# Patient Record
Sex: FEMALE | Race: WHITE | NOT HISPANIC OR LATINO | Employment: UNEMPLOYED | URBAN - METROPOLITAN AREA
[De-identification: names, ages, dates, MRNs, and addresses within clinical notes are randomized per-mention and may not be internally consistent; named-entity substitution may affect disease eponyms.]

---

## 2020-02-10 ENCOUNTER — HISTORICAL (OUTPATIENT)
Dept: URGENT CARE | Facility: CLINIC | Age: 85
End: 2020-02-10

## 2020-02-10 LAB
BILIRUB SERPL-MCNC: NEGATIVE MG/DL
BLOOD URINE, POC: NEGATIVE
CLARITY, POC UA: CLEAR
COLOR, POC UA: YELLOW
GLUCOSE UR QL STRIP: NEGATIVE
KETONES UR QL STRIP: NEGATIVE
LEUKOCYTE EST, POC UA: NEGATIVE
NITRITE, POC UA: NEGATIVE
PH, POC UA: 7
PROTEIN, POC: NEGATIVE
SPECIFIC GRAVITY, POC UA: 1.01
UROBILINOGEN, POC UA: NORMAL

## 2020-02-12 LAB — FINAL CULTURE: NO GROWTH

## 2022-04-11 ENCOUNTER — HISTORICAL (OUTPATIENT)
Dept: ADMINISTRATIVE | Facility: HOSPITAL | Age: 87
End: 2022-04-11

## 2022-04-29 VITALS
BODY MASS INDEX: 17.72 KG/M2 | HEIGHT: 63 IN | WEIGHT: 100 LBS | OXYGEN SATURATION: 98 % | SYSTOLIC BLOOD PRESSURE: 143 MMHG | DIASTOLIC BLOOD PRESSURE: 73 MMHG

## 2022-09-22 ENCOUNTER — HISTORICAL (OUTPATIENT)
Dept: ADMINISTRATIVE | Facility: HOSPITAL | Age: 87
End: 2022-09-22

## 2023-03-08 ENCOUNTER — HOSPITAL ENCOUNTER (EMERGENCY)
Facility: HOSPITAL | Age: 88
Discharge: HOME | End: 2023-03-08
Attending: EMERGENCY MEDICINE
Payer: MEDICARE

## 2023-03-08 VITALS
OXYGEN SATURATION: 92 % | HEART RATE: 70 BPM | TEMPERATURE: 98 F | HEIGHT: 63 IN | SYSTOLIC BLOOD PRESSURE: 155 MMHG | BODY MASS INDEX: 16.12 KG/M2 | DIASTOLIC BLOOD PRESSURE: 78 MMHG | WEIGHT: 91 LBS | RESPIRATION RATE: 14 BRPM

## 2023-03-08 DIAGNOSIS — I16.0 HYPERTENSIVE URGENCY: Primary | ICD-10-CM

## 2023-03-08 DIAGNOSIS — R42 DIZZY: ICD-10-CM

## 2023-03-08 DIAGNOSIS — R53.1 WEAKNESS: ICD-10-CM

## 2023-03-08 LAB
ALBUMIN SERPL-MCNC: 4.1 G/DL (ref 3.4–4.8)
ALBUMIN/GLOB SERPL: 1.7 RATIO (ref 1.1–2)
ALP SERPL-CCNC: 88 UNIT/L (ref 40–150)
ALT SERPL-CCNC: 15 UNIT/L (ref 0–55)
APPEARANCE UR: CLEAR
AST SERPL-CCNC: 35 UNIT/L (ref 5–34)
BACTERIA #/AREA URNS AUTO: ABNORMAL /HPF
BASOPHILS # BLD AUTO: 0.06 X10(3)/MCL (ref 0–0.2)
BASOPHILS NFR BLD AUTO: 0.6 %
BILIRUB UR QL STRIP.AUTO: NEGATIVE MG/DL
BILIRUBIN DIRECT+TOT PNL SERPL-MCNC: 1.5 MG/DL
BUN SERPL-MCNC: 23.5 MG/DL (ref 9.8–20.1)
CALCIUM SERPL-MCNC: 9.8 MG/DL (ref 8.4–10.2)
CHLORIDE SERPL-SCNC: 109 MMOL/L (ref 98–111)
CO2 SERPL-SCNC: 25 MMOL/L (ref 23–31)
COLOR UR AUTO: YELLOW
CREAT SERPL-MCNC: 0.69 MG/DL (ref 0.55–1.02)
CYSTINE CRY URNS QL MICRO: ABNORMAL /HPF
DIGOXIN SERPL-MCNC: 0.7 NG/ML (ref 0.8–2)
EOSINOPHIL # BLD AUTO: 0.15 X10(3)/MCL (ref 0–0.9)
EOSINOPHIL NFR BLD AUTO: 1.4 %
ERYTHROCYTE [DISTWIDTH] IN BLOOD BY AUTOMATED COUNT: 14 % (ref 11.5–17)
GFR SERPLBLD CREATININE-BSD FMLA CKD-EPI: >60 MLS/MIN/1.73/M2
GLOBULIN SER-MCNC: 2.4 GM/DL (ref 2.4–3.5)
GLUCOSE SERPL-MCNC: 105 MG/DL (ref 75–121)
GLUCOSE UR QL STRIP.AUTO: NEGATIVE MG/DL
HCT VFR BLD AUTO: 46.5 % (ref 37–47)
HGB BLD-MCNC: 15.1 G/DL (ref 12–16)
IMM GRANULOCYTES # BLD AUTO: 0.05 X10(3)/MCL (ref 0–0.04)
IMM GRANULOCYTES NFR BLD AUTO: 0.5 %
KETONES UR QL STRIP.AUTO: NEGATIVE MG/DL
LEUKOCYTE ESTERASE UR QL STRIP.AUTO: ABNORMAL UNIT/L
LYMPHOCYTES # BLD AUTO: 1.14 X10(3)/MCL (ref 0.6–4.6)
LYMPHOCYTES NFR BLD AUTO: 10.6 %
MCH RBC QN AUTO: 30.4 PG
MCHC RBC AUTO-ENTMCNC: 32.5 G/DL (ref 33–36)
MCV RBC AUTO: 93.8 FL (ref 80–94)
MONOCYTES # BLD AUTO: 0.74 X10(3)/MCL (ref 0.1–1.3)
MONOCYTES NFR BLD AUTO: 6.9 %
NEUTROPHILS # BLD AUTO: 8.59 X10(3)/MCL (ref 2.1–9.2)
NEUTROPHILS NFR BLD AUTO: 80 %
NITRITE UR QL STRIP.AUTO: NEGATIVE
NRBC BLD AUTO-RTO: 0 %
PH UR STRIP.AUTO: 6.5 [PH]
PLATELET # BLD AUTO: 318 X10(3)/MCL (ref 130–400)
PMV BLD AUTO: 10 FL (ref 7.4–10.4)
POCT GLUCOSE: 101 MG/DL (ref 70–110)
POTASSIUM SERPL-SCNC: 4.4 MMOL/L (ref 3.5–5.1)
PROT SERPL-MCNC: 6.5 GM/DL (ref 5.8–7.6)
PROT UR QL STRIP.AUTO: NEGATIVE MG/DL
RBC # BLD AUTO: 4.96 X10(6)/MCL (ref 4.2–5.4)
RBC #/AREA URNS AUTO: ABNORMAL /HPF
RBC UR QL AUTO: NEGATIVE UNIT/L
SODIUM SERPL-SCNC: 143 MMOL/L (ref 132–146)
SP GR UR STRIP.AUTO: 1.02 (ref 1–1.03)
SQUAMOUS #/AREA URNS AUTO: <5 /HPF
TROPONIN I SERPL-MCNC: 0.01 NG/ML (ref 0–0.04)
UROBILINOGEN UR STRIP-ACNC: 0.2 MG/DL
WBC # SPEC AUTO: 10.7 X10(3)/MCL (ref 4.5–11.5)
WBC #/AREA URNS AUTO: <5 /HPF

## 2023-03-08 PROCEDURE — 99285 EMERGENCY DEPT VISIT HI MDM: CPT | Mod: 25

## 2023-03-08 PROCEDURE — 81001 URINALYSIS AUTO W/SCOPE: CPT | Performed by: EMERGENCY MEDICINE

## 2023-03-08 PROCEDURE — 93005 ELECTROCARDIOGRAM TRACING: CPT

## 2023-03-08 PROCEDURE — 80162 ASSAY OF DIGOXIN TOTAL: CPT | Performed by: EMERGENCY MEDICINE

## 2023-03-08 PROCEDURE — 93010 ELECTROCARDIOGRAM REPORT: CPT | Mod: ,,, | Performed by: INTERNAL MEDICINE

## 2023-03-08 PROCEDURE — 63600175 PHARM REV CODE 636 W HCPCS: Performed by: EMERGENCY MEDICINE

## 2023-03-08 PROCEDURE — 85025 COMPLETE CBC W/AUTO DIFF WBC: CPT | Performed by: EMERGENCY MEDICINE

## 2023-03-08 PROCEDURE — 84484 ASSAY OF TROPONIN QUANT: CPT | Performed by: EMERGENCY MEDICINE

## 2023-03-08 PROCEDURE — 25000003 PHARM REV CODE 250: Performed by: EMERGENCY MEDICINE

## 2023-03-08 PROCEDURE — 80053 COMPREHEN METABOLIC PANEL: CPT | Performed by: EMERGENCY MEDICINE

## 2023-03-08 PROCEDURE — 93010 EKG 12-LEAD: ICD-10-PCS | Mod: ,,, | Performed by: INTERNAL MEDICINE

## 2023-03-08 PROCEDURE — 96360 HYDRATION IV INFUSION INIT: CPT

## 2023-03-08 RX ORDER — METOPROLOL TARTRATE 25 MG/1
25 TABLET, FILM COATED ORAL 2 TIMES DAILY
COMMUNITY
Start: 2023-01-04

## 2023-03-08 RX ORDER — FLUTICASONE PROPIONATE 50 MCG
2 SPRAY, SUSPENSION (ML) NASAL 2 TIMES DAILY
COMMUNITY

## 2023-03-08 RX ORDER — ATORVASTATIN CALCIUM 20 MG/1
20 TABLET, FILM COATED ORAL DAILY
COMMUNITY
Start: 2023-01-04

## 2023-03-08 RX ORDER — GLUCOSAMINE/CHONDRO SU A 500-400 MG
1 TABLET ORAL 2 TIMES DAILY
COMMUNITY

## 2023-03-08 RX ORDER — MULTIVITAMIN/IRON/FOLIC ACID 18MG-0.4MG
1 TABLET ORAL DAILY
COMMUNITY

## 2023-03-08 RX ORDER — LATANOPROST 50 UG/ML
1 SOLUTION/ DROPS OPHTHALMIC DAILY
COMMUNITY
Start: 2023-01-19

## 2023-03-08 RX ORDER — METOPROLOL TARTRATE 25 MG/1
25 TABLET, FILM COATED ORAL
Status: COMPLETED | OUTPATIENT
Start: 2023-03-08 | End: 2023-03-08

## 2023-03-08 RX ORDER — VITAMIN E 268 MG
400 CAPSULE ORAL DAILY
COMMUNITY

## 2023-03-08 RX ORDER — ELECTROLYTES/DEXTROSE
1 SOLUTION, ORAL ORAL DAILY
COMMUNITY

## 2023-03-08 RX ORDER — DIGOXIN 125 MCG
0.12 TABLET ORAL DAILY
COMMUNITY
Start: 2023-01-04

## 2023-03-08 RX ORDER — APIXABAN 2.5 MG/1
2.5 TABLET, FILM COATED ORAL 2 TIMES DAILY
COMMUNITY
Start: 2023-01-04

## 2023-03-08 RX ADMIN — METOPROLOL TARTRATE 25 MG: 25 TABLET, FILM COATED ORAL at 05:03

## 2023-03-08 RX ADMIN — SODIUM CHLORIDE, POTASSIUM CHLORIDE, SODIUM LACTATE AND CALCIUM CHLORIDE 500 ML: 600; 310; 30; 20 INJECTION, SOLUTION INTRAVENOUS at 04:03

## 2023-03-08 NOTE — DISCHARGE INSTRUCTIONS
Continue all medications as prescribed.   Take your blood pressure in the mornings and write it down. Also take it in the evening before bed and write it down.  Take these recordings to Dr Willis

## 2023-03-08 NOTE — ED PROVIDER NOTES
Encounter Date: 3/8/2023    SCRIBE #1 NOTE: I, Deep Silverman, am scribing for, and in the presence of,  Moni Larson DO. I have scribed the following portions of the note - Other sections scribed: HPI, ROS, PE.     History     Chief Complaint   Patient presents with    Dizziness     C/O DIZZINESS WHEN GETTING OUT OF BED 2 HOURS PTA. STATES DIZZINESS SLIGHTLY BETTER NOW. DENIES PAIN.      89 year old female presents to ED c/o dizziness and weakness onset 0100. Pt states that she got up to use the restroom and felt dizzy and weak. Pt reports that laying down helps relieve her symptoms. Pt states that she had and episode of weakness in ED lobby but currently symptoms have resolved. Pt describes her dizziness ans feeling light headed. Pt denies chest pain and nausea. Pt denies history of similar symptoms. Pt reports she is prescribed digitalis.  PCP: Ishaan Willis MD  Cardiologist: [facility]    The history is provided by the patient. No  was used.   Dizziness  This is a new problem. The current episode started 1 to 2 hours ago. The problem has been resolved. Pertinent negatives include no chest pain. The symptoms are aggravated by walking. The symptoms are relieved by lying down. She has tried nothing for the symptoms.   Review of patient's allergies indicates:   Allergen Reactions    Brimonidine-timolol      Other reaction(s): eyes red, swollen, itching    Cephalexin      Other reaction(s): nausea,dizziness    Codeine      Other reaction(s): NAUSEA    Meperidine      Other reaction(s): UPSET STOMACH    Morphine      Other reaction(s): UPSET STOMACH    Penicillins      Other reaction(s): dizzy, nausea    Propoxyphene      Other reaction(s): NAUSEA    Sulfa (sulfonamide antibiotics)      No past medical history on file.  No past surgical history on file.  No family history on file.     Review of Systems   Constitutional:  Negative for chills and fever.   Cardiovascular:  Negative for chest pain.    Gastrointestinal:  Negative for nausea.   Neurological:  Positive for dizziness, weakness and light-headedness.     Physical Exam     Initial Vitals [03/08/23 0146]   BP Pulse Resp Temp SpO2   (!) 170/92 74 18 97.6 °F (36.4 °C) 98 %      MAP       --         Physical Exam    Nursing note and vitals reviewed.  Constitutional: She appears well-developed and well-nourished. She is not diaphoretic. She does not appear ill. No distress.   HENT:   Head: Normocephalic and atraumatic.   Right Ear: External ear normal.   Left Ear: External ear normal.   Mouth/Throat: Oropharynx is clear and moist.   Eyes: Conjunctivae and EOM are normal. Pupils are equal, round, and reactive to light.   Neck: Neck supple. No tracheal deviation present.   Cardiovascular:  Normal rate, regular rhythm, normal heart sounds and intact distal pulses.           No murmur heard.  Pulmonary/Chest: Breath sounds normal. No respiratory distress. She has no wheezes.   Abdominal: Abdomen is soft. Bowel sounds are normal. She exhibits no distension. There is no abdominal tenderness.   No right CVA tenderness.  No left CVA tenderness.   Musculoskeletal:         General: No edema. Normal range of motion.      Cervical back: Neck supple.     Neurological: She is alert and oriented to person, place, and time. She has normal strength. No cranial nerve deficit. GCS score is 15. GCS eye subscore is 4. GCS verbal subscore is 5. GCS motor subscore is 6.   Skin: Skin is warm and dry. Capillary refill takes less than 2 seconds. No rash noted. No pallor.       ED Course   Procedures  Labs Reviewed   COMPREHENSIVE METABOLIC PANEL - Abnormal; Notable for the following components:       Result Value    Blood Urea Nitrogen 23.5 (*)     Aspartate Aminotransferase 35 (*)     All other components within normal limits   CBC WITH DIFFERENTIAL - Abnormal; Notable for the following components:    MCHC 32.5 (*)     IG# 0.05 (*)     All other components within normal limits    URINALYSIS, REFLEX TO URINE CULTURE - Abnormal; Notable for the following components:    Leukocyte Esterase, UA 1+ (*)     All other components within normal limits   DIGOXIN LEVEL - Abnormal; Notable for the following components:    Digoxin Level 0.7 (*)     All other components within normal limits   URINALYSIS, MICROSCOPIC - Abnormal; Notable for the following components:    Cystine Cyrstals, UA Rare (*)     All other components within normal limits   TROPONIN I - Normal   CBC W/ AUTO DIFFERENTIAL    Narrative:     The following orders were created for panel order CBC auto differential.  Procedure                               Abnormality         Status                     ---------                               -----------         ------                     CBC with Differential[[id number]]        Abnormal            Final result                 Please view results for these tests on the individual orders.   POCT GLUCOSE   POCT GLUCOSE MONITORING CONTINUOUS     EKG Readings: (Independently Interpreted)   Rhythm: Atrial Fibrillation. Heart Rate: 78. Ectopy: No Ectopy. Conduction: Normal. ST Segment Depression: V5 and V6. T Waves: Normal.   Performed at 0352     Imaging Results              CT Head Without Contrast (Final result)  Result time 03/08/23 06:34:49      Final result by Will Gilliland MD (03/08/23 06:34:49)                   Impression:      No acute intracranial findings.    No significant discrepancy with the preliminary report.      Electronically signed by: Will Gilliland  Date:    03/08/2023  Time:    06:34               Narrative:    EXAMINATION:  CT HEAD WITHOUT CONTRAST    CLINICAL HISTORY:  Dizziness, persistent/recurrent, cardiac or vascular cause suspected;    TECHNIQUE:  CT imaging of the head performed from the skull base to the vertex without intravenous contrast.  mGycm. Automatic exposure control, adjustment of mA/kV or iterative reconstruction technique was used to reduce  radiation.    COMPARISON:  None Available.    FINDINGS:  There is no acute cortical infarct, hemorrhage or mass lesion.  There are some basal ganglia, cerebellar and cerebral white matter calcifications.  Elsewhere there is moderate patchy hypoattenuation in the cerebral white matter which is nonspecific but most commonly associated with chronic small vessel ischemic changes.  The ventricles are not significantly enlarged.  Mild generalized atrophy.  There are vascular calcifications.    Visualized paranasal sinuses and mastoid air cells are clear.                        Preliminary result by Will Gilliland MD (03/08/23 04:19:47)                   Narrative:    START OF REPORT:  TECHNIQUE: CT OF THE HEAD WAS PERFORMED WITHOUT INTRAVENOUS CONTRAST WITH AXIAL AS WELL AS CORONAL AND SAGITTAL IMAGES.    COMPARISON: NONE.    DOSAGE INFORMATION: AUTOMATED EXPOSURE CONTROL WAS UTILIZED.    CLINICAL HISTORY: DIZZINESS.    Findings:  Hemorrhage: No acute intracranial hemorrhage is seen.  CSF spaces: The ventricles, sulci and basal cisterns all appear moderately prominent consistent with global cerebral atrophy.  Brain parenchyma: There is preservation of the grey white junction throughout. Moderate microvascular change is seen in portions of the periventricular and deep white matter tracts.  Cerebellum: Unremarkable.  Vascular: Severe atheromatous calcification of the intracranial arteries is seen.  Sella and skull base: The sella appears to be within normal limits for age.  Herniation: None.  Intracranial calcifications: Incidental note is made of bilateral choroid plexus calcification. Incidental note is made of some pineal region calcification. Incidental note is made of some calcification of the falx. Incidental note is made of bilateral basal ganglia, coronal radiata and dentate nuclei calcifications.  Calvarium: No acute linear or depressed skull fracture is seen.    Maxillofacial Structures:  Paranasal sinuses: The  visualized paranasal sinuses appear clear with no mucoperiosteal thickening or air fluid levels identified.  Orbits: The orbits appear unremarkable.  Zygomatic arches: The zygomatic arches are intact and unremarkable.  Temporal bones and mastoids: The temporal bones and mastoids appear unremarkable.  TMJ: The mandibular condyles appear normally placed with respect to the mandibular fossa.      Impression:  1. No acute intracranial process identified. Details and findings as noted above.                          Preliminary result by Serge Kraft Jr., MD (03/08/23 04:19:47)                   Narrative:    START OF REPORT:  TECHNIQUE: CT OF THE HEAD WAS PERFORMED WITHOUT INTRAVENOUS CONTRAST WITH AXIAL AS WELL AS CORONAL AND SAGITTAL IMAGES.    COMPARISON: NONE.    DOSAGE INFORMATION: AUTOMATED EXPOSURE CONTROL WAS UTILIZED.    CLINICAL HISTORY: DIZZINESS.    Findings:  Hemorrhage: No acute intracranial hemorrhage is seen.  CSF spaces: The ventricles, sulci and basal cisterns all appear moderately prominent consistent with global cerebral atrophy.  Brain parenchyma: There is preservation of the grey white junction throughout. Moderate microvascular change is seen in portions of the periventricular and deep white matter tracts.  Cerebellum: Unremarkable.  Vascular: Severe atheromatous calcification of the intracranial arteries is seen.  Sella and skull base: The sella appears to be within normal limits for age.  Herniation: None.  Intracranial calcifications: Incidental note is made of bilateral choroid plexus calcification. Incidental note is made of some pineal region calcification. Incidental note is made of some calcification of the falx. Incidental note is made of bilateral basal ganglia, coronal radiata and dentate nuclei calcifications.  Calvarium: No acute linear or depressed skull fracture is seen.    Maxillofacial Structures:  Paranasal sinuses: The visualized paranasal sinuses appear clear with no  mucoperiosteal thickening or air fluid levels identified.  Orbits: The orbits appear unremarkable.  Zygomatic arches: The zygomatic arches are intact and unremarkable.  Temporal bones and mastoids: The temporal bones and mastoids appear unremarkable.  TMJ: The mandibular condyles appear normally placed with respect to the mandibular fossa.      Impression:  1. No acute intracranial process identified. Details and findings as noted above.                                         X-Ray Chest AP Portable (In process)                   X-Rays:   Independently Interpreted Readings:   Chest X-Ray: No acute abnormalities.   Medications   lactated ringers bolus 500 mL (0 mLs Intravenous Stopped 3/8/23 9212)   metoprolol tartrate (LOPRESSOR) tablet 25 mg (25 mg Oral Given 3/8/23 7701)         Medical Decision Making  Initial evaluation: well appearing, normal exam    Differential diagnosis includes but not limited to stroke, ACS, electrolyte abnormality, dehydration, anemia, UTI, pneumonia     ED management:   IVF   One BP reading of SBP 210s, otherwise has ranged from 150s-180s   Given home dose of Metoprolol with improvement  Labs normal  CXR clear and CT head normal  Discussed results with patient and daughter. Patient is feeling well, ambulating in ED without any symptoms. Shared decision making and offered admission for observation vs discharge home to follow up with PCP. Patient wants to go home, daughter and myself are comfortable with this. I recommended she continue all meds as prescribed. Take and record blood pressure twice daily and follow up with Dr Willis next week     Problems Addressed:  Hypertensive urgency: acute illness or injury that poses a threat to life or bodily functions    Amount and/or Complexity of Data Reviewed  External Data Reviewed: notes.     Details: Reviewed hospitalization in 12/2021 at [facility] for AFib with RVR.  She was admitted on a Cardizem drip and was transitioned to oral metoprolol and  digoxin as well as Eliquis.  She was discharged to rehab for therapy.  Labs: ordered. Decision-making details documented in ED Course.  Radiology: ordered and independent interpretation performed. Decision-making details documented in ED Course.  ECG/medicine tests: ordered and independent interpretation performed. Decision-making details documented in ED Course.    Risk  Prescription drug management.  Decision regarding hospitalization.          Scribe Attestation:   Scribe #1: I performed the above scribed service and the documentation accurately describes the services I performed. I attest to the accuracy of the note.    Attending Attestation:           Physician Attestation for Scribe:  Physician Attestation Statement for Scribe #1: I, Moni Larson, DO, reviewed documentation, as scribed by Deep Silverman in my presence, and it is both accurate and complete.                        Clinical Impression:   Final diagnoses:  [R53.1] Weakness  [I16.0] Hypertensive urgency (Primary)        ED Disposition Condition    Discharge Stable          ED Prescriptions    None       Follow-up Information       Follow up With Specialties Details Why Contact Info    Ishaan Willis MD Family Medicine Schedule an appointment as soon as possible for a visit on 3/13/2023  [address] [facility]  [facility] [address]  [phone]               Moni Larsno MD  03/08/23 0336

## 2023-09-15 ENCOUNTER — HOSPITAL ENCOUNTER (EMERGENCY)
Facility: HOSPITAL | Age: 88
Discharge: HOME | End: 2023-09-15
Attending: STUDENT IN AN ORGANIZED HEALTH CARE EDUCATION/TRAINING PROGRAM
Payer: MEDICARE

## 2023-09-15 VITALS
OXYGEN SATURATION: 97 % | HEIGHT: 63 IN | WEIGHT: 90 LBS | HEART RATE: 87 BPM | SYSTOLIC BLOOD PRESSURE: 174 MMHG | BODY MASS INDEX: 15.95 KG/M2 | TEMPERATURE: 98 F | RESPIRATION RATE: 17 BRPM | DIASTOLIC BLOOD PRESSURE: 76 MMHG

## 2023-09-15 DIAGNOSIS — S01.81XA FACIAL LACERATION, INITIAL ENCOUNTER: ICD-10-CM

## 2023-09-15 DIAGNOSIS — S09.90XA CLOSED HEAD INJURY, INITIAL ENCOUNTER: Primary | ICD-10-CM

## 2023-09-15 DIAGNOSIS — M25.532 ACUTE WRIST PAIN, LEFT: ICD-10-CM

## 2023-09-15 DIAGNOSIS — T14.90XA TRAUMA: ICD-10-CM

## 2023-09-15 LAB
ABORH RETYPE: NORMAL
ALBUMIN SERPL-MCNC: 4 G/DL (ref 3.4–4.8)
ALBUMIN/GLOB SERPL: 1.3 RATIO (ref 1.1–2)
ALP SERPL-CCNC: 106 UNIT/L (ref 40–150)
ALT SERPL-CCNC: 14 UNIT/L (ref 0–55)
AMPHET UR QL SCN: NEGATIVE
APPEARANCE UR: CLEAR
APTT PPP: 30.6 SECONDS (ref 23.2–33.7)
AST SERPL-CCNC: 46 UNIT/L (ref 5–34)
BACTERIA #/AREA URNS AUTO: NORMAL /HPF
BARBITURATE SCN PRESENT UR: NEGATIVE
BASOPHILS # BLD AUTO: 0.07 X10(3)/MCL
BASOPHILS NFR BLD AUTO: 0.7 %
BENZODIAZ UR QL SCN: NEGATIVE
BILIRUB SERPL-MCNC: 1.6 MG/DL
BILIRUB UR QL STRIP.AUTO: NEGATIVE
BUN SERPL-MCNC: 20.4 MG/DL (ref 9.8–20.1)
CALCIUM SERPL-MCNC: 9.4 MG/DL (ref 8.4–10.2)
CANNABINOIDS UR QL SCN: NEGATIVE
CHLORIDE SERPL-SCNC: 107 MMOL/L (ref 98–111)
CO2 SERPL-SCNC: 21 MMOL/L (ref 23–31)
COCAINE UR QL SCN: NEGATIVE
COLOR UR: ABNORMAL
CREAT SERPL-MCNC: 0.68 MG/DL (ref 0.55–1.02)
EOSINOPHIL # BLD AUTO: 0.16 X10(3)/MCL (ref 0–0.9)
EOSINOPHIL NFR BLD AUTO: 1.6 %
ERYTHROCYTE [DISTWIDTH] IN BLOOD BY AUTOMATED COUNT: 13.9 % (ref 11.5–17)
ETHANOL SERPL-MCNC: <10 MG/DL
FENTANYL UR QL SCN: NEGATIVE
GFR SERPLBLD CREATININE-BSD FMLA CKD-EPI: >60 MLS/MIN/1.73/M2
GLOBULIN SER-MCNC: 3.1 GM/DL (ref 2.4–3.5)
GLUCOSE SERPL-MCNC: 162 MG/DL (ref 75–121)
GLUCOSE UR QL STRIP.AUTO: NEGATIVE
GROUP & RH: NORMAL
HCT VFR BLD AUTO: 47 % (ref 37–47)
HGB BLD-MCNC: 15.3 G/DL (ref 12–16)
IMM GRANULOCYTES # BLD AUTO: 0.05 X10(3)/MCL (ref 0–0.04)
IMM GRANULOCYTES NFR BLD AUTO: 0.5 %
INDIRECT COOMBS GEL: NORMAL
INR PPP: 1
KETONES UR QL STRIP.AUTO: NEGATIVE
LACTATE SERPL-SCNC: 2 MMOL/L (ref 0.5–2.2)
LEUKOCYTE ESTERASE UR QL STRIP.AUTO: NEGATIVE
LYMPHOCYTES # BLD AUTO: 1.13 X10(3)/MCL (ref 0.6–4.6)
LYMPHOCYTES NFR BLD AUTO: 11.6 %
MCH RBC QN AUTO: 30.2 PG (ref 27–31)
MCHC RBC AUTO-ENTMCNC: 32.6 G/DL (ref 33–36)
MCV RBC AUTO: 92.9 FL (ref 80–94)
MDMA UR QL SCN: NEGATIVE
MONOCYTES # BLD AUTO: 0.68 X10(3)/MCL (ref 0.1–1.3)
MONOCYTES NFR BLD AUTO: 7 %
NEUTROPHILS # BLD AUTO: 7.66 X10(3)/MCL (ref 2.1–9.2)
NEUTROPHILS NFR BLD AUTO: 78.6 %
NITRITE UR QL STRIP.AUTO: NEGATIVE
NRBC BLD AUTO-RTO: 0 %
OPIATES UR QL SCN: NEGATIVE
PCP UR QL: NEGATIVE
PH UR STRIP.AUTO: 7 [PH]
PH UR: 7 [PH] (ref 3–11)
PLATELET # BLD AUTO: 317 X10(3)/MCL (ref 130–400)
PMV BLD AUTO: 10.9 FL (ref 7.4–10.4)
POTASSIUM SERPL-SCNC: 4.7 MMOL/L (ref 3.5–5.1)
PROT SERPL-MCNC: 7.1 GM/DL (ref 5.8–7.6)
PROT UR QL STRIP.AUTO: NEGATIVE
PROTHROMBIN TIME: 13.1 SECONDS (ref 12.5–14.5)
RBC # BLD AUTO: 5.06 X10(6)/MCL (ref 4.2–5.4)
RBC #/AREA URNS AUTO: <5 /HPF
RBC UR QL AUTO: NEGATIVE
SODIUM SERPL-SCNC: 142 MMOL/L (ref 132–146)
SP GR UR STRIP.AUTO: >=1.04 (ref 1–1.03)
SPECIFIC GRAVITY, URINE AUTO (.000) (OHS): >=1.04 (ref 1–1.03)
SPECIMEN OUTDATE: NORMAL
SQUAMOUS #/AREA URNS AUTO: <5 /HPF
UROBILINOGEN UR STRIP-ACNC: 1
WBC # SPEC AUTO: 9.75 X10(3)/MCL (ref 4.5–11.5)
WBC #/AREA URNS AUTO: <5 /HPF

## 2023-09-15 PROCEDURE — 99285 EMERGENCY DEPT VISIT HI MDM: CPT | Mod: 25

## 2023-09-15 PROCEDURE — 82077 ASSAY SPEC XCP UR&BREATH IA: CPT | Performed by: STUDENT IN AN ORGANIZED HEALTH CARE EDUCATION/TRAINING PROGRAM

## 2023-09-15 PROCEDURE — 12011 RPR F/E/E/N/L/M 2.5 CM/<: CPT

## 2023-09-15 PROCEDURE — 63600175 PHARM REV CODE 636 W HCPCS: Performed by: STUDENT IN AN ORGANIZED HEALTH CARE EDUCATION/TRAINING PROGRAM

## 2023-09-15 PROCEDURE — G0390 TRAUMA RESPONS W/HOSP CRITI: HCPCS

## 2023-09-15 PROCEDURE — 85610 PROTHROMBIN TIME: CPT | Performed by: STUDENT IN AN ORGANIZED HEALTH CARE EDUCATION/TRAINING PROGRAM

## 2023-09-15 PROCEDURE — 80053 COMPREHEN METABOLIC PANEL: CPT | Performed by: STUDENT IN AN ORGANIZED HEALTH CARE EDUCATION/TRAINING PROGRAM

## 2023-09-15 PROCEDURE — 81001 URINALYSIS AUTO W/SCOPE: CPT | Performed by: STUDENT IN AN ORGANIZED HEALTH CARE EDUCATION/TRAINING PROGRAM

## 2023-09-15 PROCEDURE — 96374 THER/PROPH/DIAG INJ IV PUSH: CPT

## 2023-09-15 PROCEDURE — 86850 RBC ANTIBODY SCREEN: CPT | Performed by: STUDENT IN AN ORGANIZED HEALTH CARE EDUCATION/TRAINING PROGRAM

## 2023-09-15 PROCEDURE — 90715 TDAP VACCINE 7 YRS/> IM: CPT | Performed by: STUDENT IN AN ORGANIZED HEALTH CARE EDUCATION/TRAINING PROGRAM

## 2023-09-15 PROCEDURE — 85025 COMPLETE CBC W/AUTO DIFF WBC: CPT | Performed by: STUDENT IN AN ORGANIZED HEALTH CARE EDUCATION/TRAINING PROGRAM

## 2023-09-15 PROCEDURE — 83605 ASSAY OF LACTIC ACID: CPT | Performed by: STUDENT IN AN ORGANIZED HEALTH CARE EDUCATION/TRAINING PROGRAM

## 2023-09-15 PROCEDURE — 90471 IMMUNIZATION ADMIN: CPT | Performed by: STUDENT IN AN ORGANIZED HEALTH CARE EDUCATION/TRAINING PROGRAM

## 2023-09-15 PROCEDURE — 25500020 PHARM REV CODE 255: Performed by: STUDENT IN AN ORGANIZED HEALTH CARE EDUCATION/TRAINING PROGRAM

## 2023-09-15 PROCEDURE — 80307 DRUG TEST PRSMV CHEM ANLYZR: CPT | Performed by: STUDENT IN AN ORGANIZED HEALTH CARE EDUCATION/TRAINING PROGRAM

## 2023-09-15 PROCEDURE — 85730 THROMBOPLASTIN TIME PARTIAL: CPT | Performed by: STUDENT IN AN ORGANIZED HEALTH CARE EDUCATION/TRAINING PROGRAM

## 2023-09-15 RX ORDER — DIGOXIN 125 MCG
125 TABLET ORAL DAILY
COMMUNITY

## 2023-09-15 RX ORDER — ATORVASTATIN CALCIUM 20 MG/1
20 TABLET, FILM COATED ORAL DAILY
COMMUNITY

## 2023-09-15 RX ORDER — METOPROLOL TARTRATE 25 MG/1
25 TABLET, FILM COATED ORAL 2 TIMES DAILY
COMMUNITY

## 2023-09-15 RX ORDER — HYDRALAZINE HYDROCHLORIDE 20 MG/ML
10 INJECTION INTRAMUSCULAR; INTRAVENOUS
Status: COMPLETED | OUTPATIENT
Start: 2023-09-15 | End: 2023-09-15

## 2023-09-15 RX ADMIN — HYDRALAZINE HYDROCHLORIDE 10 MG: 20 INJECTION INTRAMUSCULAR; INTRAVENOUS at 12:09

## 2023-09-15 RX ADMIN — TETANUS TOXOID, REDUCED DIPHTHERIA TOXOID AND ACELLULAR PERTUSSIS VACCINE, ADSORBED 0.5 ML: 5; 2.5; 8; 8; 2.5 SUSPENSION INTRAMUSCULAR at 03:09

## 2023-09-15 RX ADMIN — IOPAMIDOL 100 ML: 755 INJECTION, SOLUTION INTRAVENOUS at 12:09

## 2023-09-15 NOTE — CONSULTS
"   Trauma Surgery   Activation Note    Patient Name: Kota Celaya  MRN: [id number]   YOB: 1929  Date: 09/15/2023    LEVEL 2 TRAUMA     Subjective:   History of present illness: Patient is an approximately 89 year old Female who presents to the ED via EMS as a level 2 trauma following ground level fall on Eliquis.  She reports slipping and falling due to her shoes at the beauty shop.  Patient reports striking her head against the chair. GCS 15.   2 cm laceration with hematoma to the left eyebrow.  Hemodynamically stable.  Satting on room air. Complains of left hip pain.  Pelvic x-ray revealed hardware of the left hip.    Primary Survey:  A   Patent   B  CTAB   C  Distal pulses intact   D GCS  15   E exposed, log-rolled and examined (see below)   F See below     VITAL SIGNS: 24 HR MIN & MAX LAST   Temp  Min: 97.3 °F (36.3 °C)  Max: 97.3 °F (36.3 °C)  97.3 °F (36.3 °C)   BP  Min: 169/95  Max: 169/95  (!) 169/95    Pulse  Min: 93  Max: 93  93    Resp  Min: 19  Max: 19  19    SpO2  Min: 98 %  Max: 98 %  98 %      HT: 5' 3" (160 cm)  WT: 40.8 kg (90 lb)  BMI: 15.9     FAST: deferred    Scheduled Meds:  Continuous Infusions:  PRN Meds:    ROS: 12 point ROS negative except as stated in HPI    Allergies: unable to obtain secondary to acuity of the patient  PMH: \ on Eliquis  PSH: unable to obtain secondary to acuity of the patient  Social history: unable to obtain secondary to acuity of the patient  Objective:   Secondary Survey:   General: Well developed, well nourished, no acute distress, AAOx3  Neuro: CNII-XII grossly intact  HEENT:  Normocephalic,  3 cm laceration with hematoma to the left eyebrow, PERRL, cervical collar in place  CV:  RRR  Pulse: 2+ RP b/l, 2+ DP b/l   Resp/chest:  Non-labored breathing, satting on room air  GI:  Abdomen soft, non-tender, non-distended  Extremities: Moves all 4 spontaneously and purposefully, no obvious gross deformities.  Back/Spine: No bony TTP, no palpable step " offs or deformities.  Cervical back: Normal. No tenderness.  Thoracic back: Normal. No tenderness.  Lumbar back: Normal. No tenderness.  Skin/wounds:  Warm, well perfused  Psych: Normal mood and affect.    Labs:   pending    Imaging:   pending    Assessment & Plan:   Kota Celaya is a 89 yo female s/p fall w/ L eyebrow lac    - Will follow up imaging. If no acute traumatic injuries, dispo per ED.          9/15/2023 12:03 PM     The above findings, diagnostics, and treatment plan were discussed with Dr. Wilfredo Echavarria who will follow with further assessments and recommendations. Please call with any questions, concerns, or clinical status changes.

## 2023-09-15 NOTE — DISCHARGE INSTRUCTIONS

## 2023-09-15 NOTE — ED PROVIDER NOTES
Encounter Date: 9/15/2023    SCRIBE #1 NOTE: I, Sid Olivas, am scribing for, and in the presence of,  Dr. Moses. I have scribed the following portions of the note - Other sections scribed: HPI, ROS, Physical Exam, MDM, Attending.       History   No chief complaint on file.    89 y/o female presents to ED via EMS as level 2 trauma following slip and fall onto tile floor with -LOC around 20 minutes PTA.  Pt says her shoes do not have much , which caused her to slip and fall.  She did hit her head, and EMS reports hematoma/laceration to L eyebrow.  Pt complains of 5/10 L hip pain, buttock pain and HA.  She is on Eliquis.  Pt was 98% on RA with /98 en route.  She denies abdominal pain or neck pain.    The history is provided by the patient and the EMS personnel.     Review of patient's allergies indicates:   Allergen Reactions    Brimonidine-timolol      Other reaction(s): eyes red, swollen, itching    Cephalexin      Other reaction(s): nausea,dizziness    Cephalexin Other (See Comments)     unknown    Codeine      Other reaction(s): NAUSEA    Meperidine      Other reaction(s): UPSET STOMACH    Morphine      Other reaction(s): UPSET STOMACH    Opioids - morphine analogues     Opioids-meperidine and related     Penicillins      Other reaction(s): dizzy, nausea    Propoxyphene      Other reaction(s): NAUSEA    Sulfa (sulfonamide antibiotics)      No past medical history on file.  No past surgical history on file.  No family history on file.     Review of Systems   Gastrointestinal:  Negative for abdominal pain.   Musculoskeletal:  Positive for arthralgias (L hip). Negative for neck pain.   Neurological:  Positive for headaches.       Physical Exam     Initial Vitals [09/15/23 1151]   BP Pulse Resp Temp SpO2   (!) 169/95 93 19 97.3 °F (36.3 °C) 98 %      MAP       --         Physical Exam    Nursing note and vitals reviewed.  Constitutional: She appears well-developed and well-nourished. She is not  diaphoretic. No distress.   Airway intact    HENT:   Head: Normocephalic.   Nose: Nose normal.   Mouth/Throat: Oropharynx is clear and moist.   2cm linear laceration just lateral to L eyebrow (hemostatic)    Eyes: EOM are normal. Pupils are equal, round, and reactive to light.   Pupils 4mm   Neck: Neck supple.   Normal range of motion.  Cardiovascular:  Normal rate and regular rhythm.           No murmur heard.  2+ dorsalis pedis pulses bilaterally    Pulmonary/Chest: Breath sounds normal. No respiratory distress. She has no wheezes. She has no rales.   Abdominal: Abdomen is soft. She exhibits no distension. There is no abdominal tenderness.   Musculoskeletal:      Cervical back: Normal range of motion and neck supple.      Comments: Pelvis stable      Neurological: She is alert and oriented to person, place, and time. She has normal strength. No cranial nerve deficit or sensory deficit. GCS score is 15. GCS eye subscore is 4. GCS verbal subscore is 5. GCS motor subscore is 6.   Skin: Skin is warm. Capillary refill takes less than 2 seconds. No rash noted.         ED Course   Lac Repair    Date/Time: 9/15/2023 3:00 PM    Performed by: Aime Moses MD  Authorized by: Aime Moses MD    Consent:     Consent obtained:  Verbal    Consent given by:  Patient    Risks, benefits, and alternatives were discussed: yes      Risks discussed:  Infection, need for additional repair, poor cosmetic result, pain, retained foreign body and poor wound healing  Universal protocol:     Patient identity confirmed:  Verbally with patient and arm band  Anesthesia:     Anesthesia method:  None  Laceration details:     Location:  Face    Face location:  L eyebrow    Length (cm):  2  Pre-procedure details:     Preparation:  Patient was prepped and draped in usual sterile fashion and imaging obtained to evaluate for foreign bodies  Exploration:     Imaging obtained comment:  CT    Imaging outcome: foreign body not noted     Treatment:     Area cleansed with:  Saline    Amount of cleaning:  Standard    Visualized foreign bodies/material removed: no      Debridement:  None  Skin repair:     Repair method:  Tissue adhesive  Approximation:     Approximation:  Close  Repair type:     Repair type:  Simple  Post-procedure details:     Dressing:  Open (no dressing)    Procedure completion:  Tolerated well, no immediate complications    Labs Reviewed   COMPREHENSIVE METABOLIC PANEL - Abnormal; Notable for the following components:       Result Value    Carbon Dioxide 21 (*)     Glucose Level 162 (*)     Blood Urea Nitrogen 20.4 (*)     Bilirubin Total 1.6 (*)     Aspartate Aminotransferase 46 (*)     All other components within normal limits   URINALYSIS, REFLEX TO URINE CULTURE - Abnormal; Notable for the following components:    Specific Gravity, UA >=1.040 (*)     All other components within normal limits   DRUG SCREEN, URINE (BEAKER) - Abnormal; Notable for the following components:    Specific Gravity, Urine Auto >=1.040 (*)     All other components within normal limits    Narrative:     Cut off concentrations:    Amphetamines - 1000 ng/ml  Barbiturates - 200 ng/ml  Benzodiazepine - 200 ng/ml  Cannabinoids (THC) - 50 ng/ml  Cocaine - 300 ng/ml  Fentanyl - 1.0 ng/ml  MDMA - 500 ng/ml  Opiates - 300 ng/ml   Phencyclidine (PCP) - 25 ng/ml    Specimen submitted for drug analysis and tested for pH and specific gravity in order to evaluate sample integrity. Suspect tampering if specific gravity is <1.003 and/or pH is not within the range of 4.5 - 8.0  False negatives may result form substances such as bleach added to urine.  False positives may result for the presence of a substance with similar chemical structure to the drug or its metabolite.    This test provides only a PRELIMINARY analytical test result. A more specific alternate chemical method must be used in order to obtain a confirmed analytical result. Gas chromatography/mass  spectrometry (GC/MS) is the preferred confirmatory method. Other chemical confirmation methods are available. Clinical consideration and professional judgement should be applied to any drug of abuse test result, particularly when preliminary positive results are used.    Positive results will be confirmed only at the physicians request. Unconfirmed screening results are to be used only for medical purposes (treatment).        CBC WITH DIFFERENTIAL - Abnormal; Notable for the following components:    MCHC 32.6 (*)     MPV 10.9 (*)     IG# 0.05 (*)     All other components within normal limits   PROTIME-INR - Normal   APTT - Normal   LACTIC ACID, PLASMA - Normal   ALCOHOL,MEDICAL (ETHANOL) - Normal   URINALYSIS, MICROSCOPIC - Normal   CBC W/ AUTO DIFFERENTIAL    Narrative:     The following orders were created for panel order CBC auto differential.  Procedure                               Abnormality         Status                     ---------                               -----------         ------                     CBC with Differential[[id number]]       Abnormal            Final result                 Please view results for these tests on the individual orders.   TYPE & SCREEN   ABORH RETYPE          Imaging Results              X-Ray Wrist Complete Left (Final result)  Result time 09/15/23 14:27:44      Final result by Althea Méndez MD (09/15/23 14:27:44)                   Impression:      No definite acute bony abnormality.      Electronically signed by: Althea Méndez  Date:    09/15/2023  Time:    14:27               Narrative:    EXAMINATION:  XR WRIST COMPLETE 3 VIEWS LEFT    CLINICAL HISTORY:  Injury, unspecified, initial encounter    COMPARISON:  None.    FINDINGS:  There is no definite acute fracture identified.  There are degenerative changes of the wrist with joint space narrowing, subchondral cystic changes and osteophyte formation.  Soft tissue calcifications around the ulna may be  degenerative/inflammatory.  Soft tissue swelling is noted.                                       CT Chest Abdomen Pelvis With Contrast (xpd) (Final result)  Result time 09/15/23 12:57:10      Final result by Althea Méndez MD (09/15/23 12:57:10)                   Impression:      No evidence of acute injury in the chest, abdomen or pelvis.      Electronically signed by: Althea Méndez  Date:    09/15/2023  Time:    12:57               Narrative:    EXAMINATION:  CT CHEST ABDOMEN PELVIS WITH CONTRAST (XPD)    CLINICAL HISTORY:  Trauma;    TECHNIQUE:  CT of the chest, abdomen and pelvis WITH intravenous contrast. The chest, abdomen and pelvis were scanned utilizing a multidetector helical scanner from the lung apex to the lesser trochanter after the administration of intravenous contrast.    Coronal and sagittal reconstructions were obtained from the axial data set.    Automatic exposure control was utilized to limit radiation dose.    Radiation Dose:    Total DLP: 459 mGy*cm    COMPARISON:  None    FINDINGS:  LINES/TUBES: None.    AIRWAYS: Clear centrally.    LUNGS: There are emphysematous changes of the lungs with scarring in the lung apices.    PLEURA: No pleural effusions. No pneumothoraces.    HEART AND MEDIASTINUM: The heart is normal in size.  There is no pericardial effusion.  There is no evidence of a thoracic aortic injury.    SOFT TISSUES: Normal.    THORACIC BONES: There are multiple healed left-sided rib fractures.    ABDOMEN/PELVIS:    HEPATOBILIARY: No evidence of acute injury.    SPLEEN: No evidence of acute injury.    PANCREAS: Unremarkable.    ADRENALS: No adrenal nodules.    KIDNEYS/URETERS: No evidence of acute injury.    PELVIC ORGANS/BLADDER: Unremarkable.    PERITONEUM/RETROPERITONEUM: No free intraperitoneal air. No free fluid.    LYMPH NODES: No lymphadenopathy.    VESSELS: Moderate vascular calcifications.    GI TRACT: No distention or wall thickening.  Colonic  diverticulosis.    ABDOMINOPELVIC BONES AND SOFT TISSUE: Soft tissues within normal limits. There is a chronic L1 compression deformity with mild to moderate loss of height.  Lucency in the left acetabulum is likely related to the arthroplasty.                                       CT Cervical Spine Without Contrast (Final result)  Result time 09/15/23 12:45:25      Final result by April Schulz MD (09/15/23 12:45:25)                   Impression:      Loss of the normal lordotic curve of the cervical spine most likely related to spasm but otherwise unremarkable with no evidence of acute fracture or dislocation seen    Incidental note is made of some degenerative changes in the  cervical spine      Electronically signed by: April Schulz  Date:    09/15/2023  Time:    12:45               Narrative:    EXAMINATION:  CT CERVICAL SPINE WITHOUT CONTRAST    CLINICAL HISTORY:  Trauma;    TECHNIQUE:  Low dose axial images, sagittal and coronal reformations were performed though the cervical spine.  Contrast was not administered. Automatic exposure control is utilized to reduce patient radiation exposure.    COMPARISON:  None    FINDINGS:  The vertebral body heights are well maintained. There is some loss of the normal lordotic curve cervical spine most likely related to spasm. No fracture is seen. No dislocation is seen. The odontoid and lateral masses appear grossly unremarkable.  There are some degenerative changes seen in the cervical spine which appear to be chronic.                                       CT Head Without Contrast (Final result)  Result time 09/15/23 12:45:08      Final result by Althea Méndez MD (09/15/23 12:45:08)                   Impression:      1. No acute intracranial abnormality.  2. Chronic microvascular ischemic changes.      Electronically signed by: Althea Méndez  Date:    09/15/2023  Time:    12:45               Narrative:    EXAMINATION:  CT HEAD WITHOUT  CONTRAST    CLINICAL HISTORY:  Trauma;    TECHNIQUE:  Axial scans were obtained from skull base to the vertex.    Coronal and sagittal reconstructions obtained from the axial data.    Automatic exposure control was utilized to limit radiation dose.    Contrast: None    Radiation Dose:    Total DLP: 888 mGy*cm    COMPARISON:  None    FINDINGS:  There is no acute intracranial hemorrhage or edema. The gray-white matter differentiation is preserved.  Patchy hypodensities in the subcortical and periventricular white represent microvascular ischemic changes.    There is no mass effect or midline shift. The ventricles and sulci are normal in size. The basal cisterns are patent. There is no abnormal extra-axial fluid collection.    There is mild soft tissue swelling in the left scalp.  The calvarium and skull base are intact. The visualized paranasal sinuses and the mastoid air cells are clear.                                       X-Ray Hip 2 or 3 views Left (with Pelvis when performed) (Final result)  Result time 09/15/23 13:38:00      Final result by Althea Méndez MD (09/15/23 13:38:00)                   Impression:      No displaced fracture identified.      Electronically signed by: Althea Méndez  Date:    09/15/2023  Time:    13:38               Narrative:    EXAMINATION:  XR HIP WITH PELVIS WHEN PERFORMED, 2 OR 3 VIEWS LEFT    CLINICAL HISTORY:  trauma;    COMPARISON:  None.    FINDINGS:  There is no displaced fracture identified.  There is satisfactory alignment of the left hip arthroplasty.  Vascular calcifications are noted.                                       X-Ray Chest 1 View (Final result)  Result time 09/15/23 13:37:27      Final result by Althea Méndez MD (09/15/23 13:37:27)                   Impression:      No acute abnormality of the chest.      Electronically signed by: Althea Méndez  Date:    09/15/2023  Time:    13:37               Narrative:    EXAMINATION:  XR CHEST 1  VIEW    CLINICAL HISTORY:  r/o bleeding or hemorrhage;    TECHNIQUE:  AP chest    COMPARISON:  None.    FINDINGS:  The heart is normal in size.  The lungs are clear.  There is no pleural effusion or visible pneumothorax.                                       Medications   hydrALAZINE injection 10 mg (10 mg Intravenous Given 9/15/23 1236)   iopamidoL (ISOVUE-370) injection 100 mL (100 mLs Intravenous Given 9/15/23 1232)   Tdap (BOOSTRIX) vaccine injection 0.5 mL (0.5 mLs Intramuscular Given 9/15/23 1524)     Medical Decision Making  Amount and/or Complexity of Data Reviewed  Labs: ordered.  Radiology: ordered. Decision-making details documented in ED Course.    Risk  Prescription drug management.    Differential diagnosis (includes but is not limited to):   ICH, skull fracture, facial fracture, spinal cord injury bowel injury, thoracic trauma, abdominal trauma, fracture, dislocation, abrasion, contusion    MDM Narrative  89-year-old female presents for evaluation as a level 2 trauma activation after a slip and fall.  Patient is on anticoagulation.  Labs are pending.  Pain and nausea control as needed.  IV fluid rehydration considered.  Tetanus booster updated.  X-rays pending at sites of pain.  Chest x-ray reviewed.  CT scans pending to rule out acute traumatic injury.  Telemetry monitoring independently reviewed and shows a sinus rhythm with heart rate in the 80s.    Update:  Labs reviewed.  Imaging reviewed.  No acute traumatic injury identified.  Patient was ambulatory at her baseline with a steady gait.  She is tolerating oral intake well.  Patient states she is ready for discharge home.  Strict return precautions discussed with the patient and her family have verbalized understanding.    Dispo:  Discharge    My independent radiology interpretation:  As above  Point of care US (independently performed and interpreted):   Decision rules/clinical scoring:     Sepsis Perfusion Assessment:     Amount and/or  Complexity of Data Reviewed  Independent historian: EMS   Summary of history: slip and fall onto tile floor with -LOC around 20 minutes PTA.  EMS reports hematoma/laceration to L eyebrow.  She is on Eliquis.  Pt was 98% on RA with /98 en route.    External data reviewed: no prior records available for review   Summary of data reviewed:  Patient arrives as a trauma registration with no prior records available  Risk and benefits of testing: discussed   Labs: ordered and reviewed  Radiology: ordered and independent interpretation performed (see above or ED course)  ECG/medicine tests: ordered and independent interpretation performed (see above or ED course)  Discussion of management or test interpretation with external provider(s): discussed with Trauma surgery consultant   Summary of discussion:  Patient cleared for discharge by Trauma surgery.    Risk  OTC medications  Prescription drug management   Parenteral controlled substances   Drug therapy requiring intense monitoring for toxicity   Decision regarding hospitalization  Shared decision making     Critical Care  none    Data Reviewed/Counseling: I have personally reviewed the patient's vital signs, nursing notes, and other relevant tests, information, and imaging. I had a detailed discussion regarding the historical points, exam findings, and any diagnostic results supporting the discharge diagnosis. I personally performed the history, PE, MDM and procedures as documented above and agree with the scribe's documentation.    Portions of this note were dictated using voice recognition software. Although it was reviewed for accuracy, some inherent voice recognition errors may have occurred and may be present in this document.          Scribe Attestation:   Scribe #1: I performed the above scribed service and the documentation accurately describes the services I performed. I attest to the accuracy of the note.    Attending Attestation:           Physician  Attestation for Scribe:  Physician Attestation Statement for Scribe #1: I, reviewed documentation, as scribed by Sid Olivas in my presence, and it is both accurate and complete.             ED Course as of 09/27/23 0740   Fri Sep 15, 2023   1353 X-Ray Hip 2 or 3 views Left (with Pelvis when performed)  Independently visualized/reviewed by me during the ED visit.  - No fracture or dislocation, hardware in appropriate position [MC]   1353 X-Ray Chest 1 View  Independently visualized/reviewed by me during the ED visit.  - No fracture or PTX [MC]   1432 X-Ray Wrist Complete Left  Independently visualized/reviewed by me during the ED visit.  - No acute displaced fracture, no dislocation [MC]   1640 I have reassessed the patient.  Patient is resting comfortably, no acute distress.  Vital signs stable.  Discussed all results including incidental findings.  Discussed need for follow up and discussed return precautions.  Answered all questions at this time.  Hemodynamically stable for continued outpatient management. Patient verbalized understanding and agreed to plan.     [MC]      ED Course User Index  [MC] Aime Moses MD                    Clinical Impression:   Final diagnoses:  [T14.90XA] Trauma  [S09.90XA] Closed head injury, initial encounter (Primary)  [S01.81XA] Facial laceration, initial encounter  [M25.532] Acute wrist pain, left        ED Disposition Condition    Discharge Stable          ED Prescriptions    None       Follow-up Information       Follow up With Specialties Details Why Contact [facility]  Schedule an appointment as soon as possible for a visit   [address] [facility] [id number]  [phone]      [facility] - Emergency Dept Emergency Medicine  If symptoms worsen [address] [facility] [id number] [phone]             Aime Moses MD  09/27/23 0740       Aime Moses MD  09/29/23 2036

## 2024-11-30 ENCOUNTER — HOSPITAL ENCOUNTER (EMERGENCY)
Facility: HOSPITAL | Age: 89
Discharge: HOME | End: 2024-11-30
Attending: EMERGENCY MEDICINE
Payer: MEDICARE

## 2024-11-30 VITALS
OXYGEN SATURATION: 97 % | RESPIRATION RATE: 16 BRPM | WEIGHT: 90 LBS | HEIGHT: 63 IN | HEART RATE: 63 BPM | SYSTOLIC BLOOD PRESSURE: 158 MMHG | DIASTOLIC BLOOD PRESSURE: 73 MMHG | BODY MASS INDEX: 15.95 KG/M2 | TEMPERATURE: 97 F

## 2024-11-30 DIAGNOSIS — I16.0 HYPERTENSIVE URGENCY: Primary | ICD-10-CM

## 2024-11-30 DIAGNOSIS — R42 DIZZINESS: ICD-10-CM

## 2024-11-30 LAB
ALBUMIN SERPL-MCNC: 4.3 G/DL (ref 3.4–4.8)
ALBUMIN/GLOB SERPL: 1.7 RATIO (ref 1.1–2)
ALP SERPL-CCNC: 79 UNIT/L (ref 40–150)
ALT SERPL-CCNC: 17 UNIT/L (ref 0–55)
ANION GAP SERPL CALC-SCNC: 11 MEQ/L
AST SERPL-CCNC: 36 UNIT/L (ref 5–34)
BACTERIA #/AREA URNS AUTO: NORMAL /HPF
BASOPHILS # BLD AUTO: 0.05 X10(3)/MCL
BASOPHILS NFR BLD AUTO: 0.7 %
BILIRUB SERPL-MCNC: 2 MG/DL
BILIRUB UR QL STRIP.AUTO: NEGATIVE
BUN SERPL-MCNC: 16 MG/DL (ref 9.8–20.1)
CALCIUM SERPL-MCNC: 9.5 MG/DL (ref 8.4–10.2)
CHLORIDE SERPL-SCNC: 104 MMOL/L (ref 98–111)
CLARITY UR: CLEAR
CO2 SERPL-SCNC: 27 MMOL/L (ref 23–31)
COLOR UR AUTO: NORMAL
CREAT SERPL-MCNC: 0.66 MG/DL (ref 0.55–1.02)
CREAT/UREA NIT SERPL: 24
EOSINOPHIL # BLD AUTO: 0.07 X10(3)/MCL (ref 0–0.9)
EOSINOPHIL NFR BLD AUTO: 1 %
ERYTHROCYTE [DISTWIDTH] IN BLOOD BY AUTOMATED COUNT: 13.8 % (ref 11.5–17)
GFR SERPLBLD CREATININE-BSD FMLA CKD-EPI: >60 ML/MIN/1.73/M2
GLOBULIN SER-MCNC: 2.5 GM/DL (ref 2.4–3.5)
GLUCOSE SERPL-MCNC: 115 MG/DL (ref 75–121)
GLUCOSE UR QL STRIP: NORMAL
HCT VFR BLD AUTO: 45.8 % (ref 37–47)
HGB BLD-MCNC: 15.2 G/DL (ref 12–16)
HGB UR QL STRIP: NEGATIVE
IMM GRANULOCYTES # BLD AUTO: 0.03 X10(3)/MCL (ref 0–0.04)
IMM GRANULOCYTES NFR BLD AUTO: 0.4 %
KETONES UR QL STRIP: NEGATIVE
LEUKOCYTE ESTERASE UR QL STRIP: NEGATIVE
LYMPHOCYTES # BLD AUTO: 0.83 X10(3)/MCL (ref 0.6–4.6)
LYMPHOCYTES NFR BLD AUTO: 11.8 %
MCH RBC QN AUTO: 31 PG (ref 27–31)
MCHC RBC AUTO-ENTMCNC: 33.2 G/DL (ref 33–36)
MCV RBC AUTO: 93.5 FL (ref 80–94)
MONOCYTES # BLD AUTO: 0.58 X10(3)/MCL (ref 0.1–1.3)
MONOCYTES NFR BLD AUTO: 8.2 %
NEUTROPHILS # BLD AUTO: 5.49 X10(3)/MCL (ref 2.1–9.2)
NEUTROPHILS NFR BLD AUTO: 77.9 %
NITRITE UR QL STRIP: NEGATIVE
NRBC BLD AUTO-RTO: 0 %
PH UR STRIP: 7 [PH]
PLATELET # BLD AUTO: 328 X10(3)/MCL (ref 130–400)
PMV BLD AUTO: 9.7 FL (ref 7.4–10.4)
POTASSIUM SERPL-SCNC: 4.3 MMOL/L (ref 3.5–5.1)
PROT SERPL-MCNC: 6.8 GM/DL (ref 5.8–7.6)
PROT UR QL STRIP: NEGATIVE
RBC # BLD AUTO: 4.9 X10(6)/MCL (ref 4.2–5.4)
RBC #/AREA URNS AUTO: NORMAL /HPF
SODIUM SERPL-SCNC: 142 MMOL/L (ref 136–145)
SP GR UR STRIP.AUTO: 1.01 (ref 1–1.03)
SQUAMOUS #/AREA URNS LPF: NORMAL /HPF
TROPONIN I SERPL-MCNC: <0.01 NG/ML (ref 0–0.04)
UROBILINOGEN UR STRIP-ACNC: NORMAL
WBC # BLD AUTO: 7.05 X10(3)/MCL (ref 4.5–11.5)
WBC #/AREA URNS AUTO: NORMAL /HPF

## 2024-11-30 PROCEDURE — 85025 COMPLETE CBC W/AUTO DIFF WBC: CPT | Performed by: EMERGENCY MEDICINE

## 2024-11-30 PROCEDURE — 25000003 PHARM REV CODE 250: Performed by: EMERGENCY MEDICINE

## 2024-11-30 PROCEDURE — 93005 ELECTROCARDIOGRAM TRACING: CPT

## 2024-11-30 PROCEDURE — 84484 ASSAY OF TROPONIN QUANT: CPT | Performed by: EMERGENCY MEDICINE

## 2024-11-30 PROCEDURE — 81001 URINALYSIS AUTO W/SCOPE: CPT | Performed by: EMERGENCY MEDICINE

## 2024-11-30 PROCEDURE — 99285 EMERGENCY DEPT VISIT HI MDM: CPT | Mod: 25

## 2024-11-30 PROCEDURE — 80053 COMPREHEN METABOLIC PANEL: CPT | Performed by: EMERGENCY MEDICINE

## 2024-11-30 RX ORDER — METOPROLOL TARTRATE 25 MG/1
25 TABLET, FILM COATED ORAL
Status: COMPLETED | OUTPATIENT
Start: 2024-11-30 | End: 2024-11-30

## 2024-11-30 RX ADMIN — METOPROLOL TARTRATE 25 MG: 25 TABLET, FILM COATED ORAL at 09:11

## 2024-11-30 NOTE — ED PROVIDER NOTES
Encounter Date: 11/30/2024    SCRIBE #1 NOTE: I, Brittany Mark, am scribing for, and in the presence of,  Moni Larson, . I have scribed the following portions of the note - the EKG reading. Other sections scribed: HPI, ROS, PE.       History     Chief Complaint   Patient presents with    Dizziness     Pt reports dizziness since yesterday, reports she normally has low bp, elevated bp with ems. Initial bp with ems was 226/124. [place] negative. Cbg-152     Patient is a 89 y/o female with a PMHx of HTN and a-fib presents to the ED via EMS for dizziness beginning last night. Caregiver reports patient had an elevated BP with EMS.     Patient reports feeling like she has high blood pressure. She reports generalized weakness and mild dizziness. She denies nausea or vomiting. She reports typically having low BP readings.     The history is provided by the patient, a caregiver and medical records. No  was used.     Review of patient's allergies indicates:   Allergen Reactions    Brimonidine-timolol      Other reaction(s): eyes red, swollen, itching    Cephalexin      Other reaction(s): nausea,dizziness    Cephalexin Other (See Comments)     unknown    Codeine      Other reaction(s): NAUSEA    Meperidine      Other reaction(s): UPSET STOMACH    Morphine      Other reaction(s): UPSET STOMACH    Opioids - morphine analogues     Opioids-meperidine and related     Penicillins      Other reaction(s): dizzy, nausea    Propoxyphene      Other reaction(s): NAUSEA    Sulfa (sulfonamide antibiotics)      Past Medical History:   Diagnosis Date    Hypertension     Paroxysmal atrial fibrillation      History reviewed. No pertinent surgical history.  No family history on file.  Social History     Tobacco Use    Smoking status: Never    Smokeless tobacco: Never     Review of Systems   Gastrointestinal:  Negative for nausea and vomiting.   Neurological:  Positive for dizziness (mild) and weakness (generalized).        Physical Exam     Initial Vitals [11/30/24 0813]   BP Pulse Resp Temp SpO2   (!) 160/105 98 18 97.3 °F (36.3 °C) 96 %      MAP       --         Physical Exam    Nursing note and vitals reviewed.  Constitutional: She appears well-developed. She is not diaphoretic. She does not appear ill. No distress.   Appears thin and frail.   HENT:   Head: Normocephalic and atraumatic.   Right Ear: External ear normal.   Left Ear: External ear normal. Mouth/Throat: Oropharynx is clear and moist.   Eyes: Conjunctivae are normal.   Neck: Neck supple. No tracheal deviation present.   Cardiovascular:  Normal rate, regular rhythm and normal heart sounds.           No murmur heard.  Pulmonary/Chest: Breath sounds normal. No respiratory distress. She has no wheezes. She has no rhonchi. She has no rales.   Abdominal: Abdomen is soft. She exhibits no distension. There is no abdominal tenderness.   No right CVA tenderness.  No left CVA tenderness.   Musculoskeletal:         General: Normal range of motion.      Cervical back: Neck supple.     Neurological: She is alert and oriented to person, place, and time. GCS score is 15. GCS eye subscore is 4. GCS verbal subscore is 5. GCS motor subscore is 6.   Skin: Skin is warm and dry. Capillary refill takes less than 2 seconds. No rash noted. No pallor.         ED Course   Procedures  Labs Reviewed   COMPREHENSIVE METABOLIC PANEL - Abnormal       Result Value    Sodium 142      Potassium 4.3      Chloride 104      CO2 27      Glucose 115      Blood Urea Nitrogen 16.0      Creatinine 0.66      Calcium 9.5      Protein Total 6.8      Albumin 4.3      Globulin 2.5      Albumin/Globulin Ratio 1.7      Bilirubin Total 2.0 (*)     ALP 79      ALT 17      AST 36 (*)     eGFR >60      Anion Gap 11.0      BUN/Creatinine Ratio 24     TROPONIN I - Normal    Troponin-I <0.010     URINALYSIS, REFLEX TO URINE CULTURE - Normal    Color, UA Light-Yellow      Appearance, UA Clear      Specific [place], UA  1.008      pH, UA 7.0      Protein, UA Negative      Glucose, UA Normal      Ketones, UA Negative      Blood, UA Negative      Bilirubin, UA Negative      Urobilinogen, UA Normal      Nitrites, UA Negative      Leukocyte Esterase, UA Negative      RBC, UA 0-5      WBC, UA 0-5      Bacteria, UA Trace      Squamous Epithelial Cells, UA None Seen     CBC W/ AUTO DIFFERENTIAL    Narrative:     The following orders were created for panel order CBC auto differential.  Procedure                               Abnormality         Status                     ---------                               -----------         ------                     CBC with Differential[[id number]]                           Final result                 Please view results for these tests on the individual orders.   CBC WITH DIFFERENTIAL    WBC 7.05      RBC 4.90      Hgb 15.2      Hct 45.8      MCV 93.5      MCH 31.0      MCHC 33.2      RDW 13.8      Platelet 328      MPV 9.7      Neut % 77.9      Lymph % 11.8      Mono % 8.2      Eos % 1.0      Basophil % 0.7      Lymph # 0.83      Neut # 5.49      Mono # 0.58      Eos # 0.07      Baso # 0.05      IG# 0.03      IG% 0.4      NRBC% 0.0       EKG Readings: (Independently Interpreted)   Initial Reading: No STEMI. Previous EKG Date: 3/8/23. Rhythm: Atrial Fibrillation. Heart Rate: 81. Ectopy: No Ectopy. Conduction: Normal. ST Segments: Normal ST Segments. T Waves: Normal. Axis: Normal. Clinical Impression: Atrial Fibrillation   Done at 8:23 on 11/30/24       Imaging Results              CT Head Without Contrast (Final result)  Result time 11/30/24 09:45:33      Final result by Kevin Taylor MD (11/30/24 09:45:33)                   Impression:      No acute intracranial process identified.      Electronically signed by: Kevin Taylor  Date:    11/30/2024  Time:    09:45               Narrative:    EXAMINATION:  CT HEAD WITHOUT CONTRAST    CLINICAL HISTORY:  Dizziness, persistent/recurrent, cardiac  or vascular cause suspected;    TECHNIQUE:  CT images of the head without IV contrast. Axial, coronal and sagittal images reviewed. Dose length product 844 mGycm. Automatic exposure control, adjustment of mA/kV or iterative reconstruction technique used to limit radiation dose.    COMPARISON:  CT 09/15/2023    FINDINGS:  Extra-axial spaces/ventricular system: Normal for age.    Intracranial hemorrhage: None identified.    Cerebral parenchyma: No acute large vessel territory infarct or mass effect identified. Similar chronic microvascular ischemic changes in the supratentorial white matter.    Vascular system: No hyperdense vessel appreciated.    Cerebellum: Normal.    Sella: Normal.    Included paranasal sinuses and mastoid air cells: Well-aerated.    Visualized orbits: Normal.    Scalp/Calvarium: No depressed skull fracture.                                       Medications   metoprolol tartrate (LOPRESSOR) tablet 25 mg (25 mg Oral Given 11/30/24 0953)     Medical Decision Making  The differential diagnosis includes, but is not limited to, hypertensive urgency versus emergency, stroke, renal failure, and polypharmacy.    Labs, urine and CT head were unremarkable  Patient with no deficit  Blood pressure improved with her home medication  Discussed importance of taking medication at adhering to a DASH diet   Recommended PCP follow up      Problems Addressed:  Dizziness: acute illness or injury that poses a threat to life or bodily functions  Hypertensive urgency: acute illness or injury that poses a threat to life or bodily functions    Amount and/or Complexity of Data Reviewed  Independent Historian: caregiver     Details: Caregiver reports patient had an elevated BP with EMS.   Labs: ordered. Decision-making details documented in ED Course.  Radiology: ordered. Decision-making details documented in ED Course.  ECG/medicine tests: ordered and independent interpretation performed.     Details: No STEMI. Previous EKG  Date: 3/8/23. Rhythm: Atrial Fibrillation. Heart Rate: 81. Ectopy: No Ectopy. Conduction: Normal. ST Segments: Normal ST Segments. T Waves: Normal. Axis: Normal. Clinical Impression: Atrial Fibrillation   Done at 8:23 on 11/30/24     Risk  Prescription drug management.            Scribe Attestation:   Scribe #1: I performed the above scribed service and the documentation accurately describes the services I performed. I attest to the accuracy of the note.    Attending Attestation:           Physician Attestation for Scribe:  Physician Attestation Statement for Scribe #1: I, Moni Larson, DO, reviewed documentation, as scribed by Brittany Flores in my presence, and it is both accurate and complete.             ED Course as of 11/30/24 1115   Sat Nov 30, 2024   0932 Patient's blood pressure elevated, will give home meds since she did not take them this morning [KM]   1108 Blood pressure improved.  Discussed with patient and her daughter.  This is likely due to high sodium intake due to Thanksgiving holidays.  Recommend patient take her blood pressure medicine as prescribed adhere to a heart healthy diet.  I recommend when she takes her blood pressure tonight continually check it as she has been [KM]      ED Course User Index  [KM] Moni Larson MD                           Clinical Impression:  Final diagnoses:  [R42] Dizziness  [I16.0] Hypertensive urgency (Primary)          ED Disposition Condition    Discharge Stable          ED Prescriptions    None       Follow-up Information       Follow up With Specialties Details Why Contact Info    Ishaan Willis MD Family Medicine Schedule an appointment as soon as possible for a visit in 2 days  [address] [facility]  [address]  [facility] [address]  [phone]      [facility]  Emergency Dept Emergency Medicine  As needed, If symptoms worsen [facility] [facility] [place] [id number] [phone]             Moni Larson MD  11/30/24 6241

## 2024-12-02 LAB
OHS QRS DURATION: 74 MS
OHS QRS DURATION: 78 MS
OHS QTC CALCULATION: 404 MS
OHS QTC CALCULATION: 408 MS